# Patient Record
Sex: MALE | Race: WHITE
[De-identification: names, ages, dates, MRNs, and addresses within clinical notes are randomized per-mention and may not be internally consistent; named-entity substitution may affect disease eponyms.]

---

## 2017-09-05 ENCOUNTER — HOSPITAL ENCOUNTER (EMERGENCY)
Dept: HOSPITAL 54 - ER | Age: 42
Discharge: LEFT BEFORE DISCHARGE | End: 2017-09-05
Payer: MEDICAID

## 2017-09-05 VITALS — DIASTOLIC BLOOD PRESSURE: 73 MMHG | SYSTOLIC BLOOD PRESSURE: 122 MMHG

## 2017-09-05 VITALS — WEIGHT: 230 LBS | HEIGHT: 72 IN | BODY MASS INDEX: 31.15 KG/M2

## 2017-09-05 DIAGNOSIS — Z53.21: Primary | ICD-10-CM

## 2017-09-05 PROCEDURE — Z7610: HCPCS

## 2017-09-05 PROCEDURE — A4606 OXYGEN PROBE USED W OXIMETER: HCPCS

## 2018-04-14 ENCOUNTER — HOSPITAL ENCOUNTER (EMERGENCY)
Dept: HOSPITAL 54 - ER | Age: 43
Discharge: HOME | End: 2018-04-14
Payer: MEDICAID

## 2018-04-14 VITALS
BODY MASS INDEX: 35.78 KG/M2 | HEIGHT: 73 IN | DIASTOLIC BLOOD PRESSURE: 90 MMHG | WEIGHT: 270 LBS | SYSTOLIC BLOOD PRESSURE: 138 MMHG

## 2018-04-14 DIAGNOSIS — F17.200: ICD-10-CM
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
## 2018-04-14 DIAGNOSIS — S09.8XXA: Primary | ICD-10-CM
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
## 2018-04-14 DIAGNOSIS — F41.9: ICD-10-CM

## 2018-04-14 PROCEDURE — 99284 EMERGENCY DEPT VISIT MOD MDM: CPT

## 2018-04-14 PROCEDURE — 70450 CT HEAD/BRAIN W/O DYE: CPT

## 2018-04-14 PROCEDURE — 99406 BEHAV CHNG SMOKING 3-10 MIN: CPT

## 2018-04-14 PROCEDURE — A4606 OXYGEN PROBE USED W OXIMETER: HCPCS

## 2018-04-14 PROCEDURE — Z7610: HCPCS

## 2018-08-01 ENCOUNTER — HOSPITAL ENCOUNTER (EMERGENCY)
Dept: HOSPITAL 54 - ER | Age: 43
Discharge: HOME | End: 2018-08-01
Payer: COMMERCIAL

## 2018-08-01 VITALS — DIASTOLIC BLOOD PRESSURE: 103 MMHG | SYSTOLIC BLOOD PRESSURE: 158 MMHG

## 2018-08-01 VITALS — WEIGHT: 270 LBS | HEIGHT: 72 IN | BODY MASS INDEX: 36.57 KG/M2

## 2018-08-01 DIAGNOSIS — F41.9: Primary | ICD-10-CM

## 2018-08-01 DIAGNOSIS — K21.9: ICD-10-CM

## 2018-08-01 DIAGNOSIS — F17.200: ICD-10-CM

## 2018-08-01 PROCEDURE — 99284 EMERGENCY DEPT VISIT MOD MDM: CPT

## 2018-08-01 PROCEDURE — A4606 OXYGEN PROBE USED W OXIMETER: HCPCS

## 2018-08-01 PROCEDURE — Z7610: HCPCS

## 2018-08-01 PROCEDURE — 96372 THER/PROPH/DIAG INJ SC/IM: CPT

## 2018-08-25 ENCOUNTER — HOSPITAL ENCOUNTER (INPATIENT)
Dept: HOSPITAL 54 - ER | Age: 43
LOS: 2 days | Discharge: LEFT BEFORE DISCHARGE | DRG: 383 | End: 2018-08-27
Attending: FAMILY MEDICINE | Admitting: NURSE PRACTITIONER
Payer: MEDICAID

## 2018-08-25 VITALS — WEIGHT: 270.03 LBS | HEIGHT: 73 IN | BODY MASS INDEX: 35.79 KG/M2

## 2018-08-25 DIAGNOSIS — E87.1: ICD-10-CM

## 2018-08-25 DIAGNOSIS — E66.9: ICD-10-CM

## 2018-08-25 DIAGNOSIS — L03.116: Primary | ICD-10-CM

## 2018-08-25 DIAGNOSIS — F41.9: ICD-10-CM

## 2018-08-25 DIAGNOSIS — K21.9: ICD-10-CM

## 2018-08-25 DIAGNOSIS — F12.929: ICD-10-CM

## 2018-08-25 DIAGNOSIS — F11.21: ICD-10-CM

## 2018-08-25 DIAGNOSIS — F12.90: ICD-10-CM

## 2018-08-25 DIAGNOSIS — N17.0: ICD-10-CM

## 2018-08-25 DIAGNOSIS — F17.200: ICD-10-CM

## 2018-08-25 DIAGNOSIS — D64.9: ICD-10-CM

## 2018-08-25 LAB
APTT PPP: 31 SEC (ref 23–34)
BASOPHILS # BLD AUTO: 0.1 /CMM (ref 0–0.2)
BASOPHILS NFR BLD AUTO: 0.6 % (ref 0–2)
BUN SERPL-MCNC: 21 MG/DL (ref 7–18)
CALCIUM SERPL-MCNC: 9.4 MG/DL (ref 8.5–10.1)
CHLORIDE SERPL-SCNC: 98 MMOL/L (ref 98–107)
CO2 SERPL-SCNC: 30 MMOL/L (ref 21–32)
CREAT SERPL-MCNC: 1.3 MG/DL (ref 0.6–1.3)
EOSINOPHIL NFR BLD AUTO: 0.6 % (ref 0–6)
GLUCOSE SERPL-MCNC: 107 MG/DL (ref 74–106)
HCT VFR BLD AUTO: 40 % (ref 39–51)
HGB BLD-MCNC: 13.1 G/DL (ref 13.5–17.5)
INR PPP: 0.94 (ref 0.87–1.13)
LYMPHOCYTES NFR BLD AUTO: 1.2 /CMM (ref 0.8–4.8)
LYMPHOCYTES NFR BLD AUTO: 12.4 % (ref 20–44)
MCH RBC QN AUTO: 30 PG (ref 26–33)
MCHC RBC AUTO-ENTMCNC: 33 G/DL (ref 31–36)
MCV RBC AUTO: 91 FL (ref 80–96)
MONOCYTES NFR BLD AUTO: 1.5 /CMM (ref 0.1–1.3)
MONOCYTES NFR BLD AUTO: 15.1 % (ref 2–12)
NEUTROPHILS # BLD AUTO: 7.1 /CMM (ref 1.8–8.9)
NEUTROPHILS NFR BLD AUTO: 71.3 % (ref 43–81)
PLATELET # BLD AUTO: 274 /CMM (ref 150–450)
POTASSIUM SERPL-SCNC: 3.7 MMOL/L (ref 3.5–5.1)
RBC # BLD AUTO: 4.36 MIL/UL (ref 4.5–6)
RDW COEFFICIENT OF VARIATION: 15 (ref 11.5–15)
SODIUM SERPL-SCNC: 132 MMOL/L (ref 136–145)
WBC NRBC COR # BLD AUTO: 10 K/UL (ref 4.3–11)

## 2018-08-25 PROCEDURE — A4606 OXYGEN PROBE USED W OXIMETER: HCPCS

## 2018-08-25 PROCEDURE — Z7610: HCPCS

## 2018-08-25 NOTE — NUR
PT AMBULATORY TO ER BED 1. BBSELF C/C LEFT LOWER EXTREMITY REDNESS/SWELLING X2 
DAYS. DENIES TRAUMA. PT PLACED IN GOWN AND ON CARDIAC MONITOR. VSS/RESP EVEN 
UNLABORED/NAD NOTED/SKIN WARM AND DRY/AFEBRILE/DENIES N-V-D/AOX4. AWAITNG MD HAYES.

## 2018-08-25 NOTE — NUR
18G IV TO R AC X 1 ATTEMPT USING ASEPTIC TECH, BLOOD HANDED OVER TO THE LAB AT 
BEDSIDE. IV FLUSHES EASILY WITH NS, NO S/S INFILTRATION NOTED AT THIS TIME.

## 2018-08-26 VITALS — DIASTOLIC BLOOD PRESSURE: 75 MMHG | SYSTOLIC BLOOD PRESSURE: 131 MMHG

## 2018-08-26 VITALS — SYSTOLIC BLOOD PRESSURE: 98 MMHG | DIASTOLIC BLOOD PRESSURE: 53 MMHG

## 2018-08-26 VITALS — SYSTOLIC BLOOD PRESSURE: 111 MMHG | DIASTOLIC BLOOD PRESSURE: 67 MMHG

## 2018-08-26 VITALS — DIASTOLIC BLOOD PRESSURE: 71 MMHG | SYSTOLIC BLOOD PRESSURE: 105 MMHG

## 2018-08-26 LAB
ALBUMIN SERPL BCP-MCNC: 3.4 G/DL (ref 3.4–5)
ALP SERPL-CCNC: 55 U/L (ref 46–116)
ALT SERPL W P-5'-P-CCNC: 26 U/L (ref 12–78)
AST SERPL W P-5'-P-CCNC: 14 U/L (ref 15–37)
BASOPHILS # BLD AUTO: 0 /CMM (ref 0–0.2)
BASOPHILS NFR BLD AUTO: 0.5 % (ref 0–2)
BILIRUB SERPL-MCNC: 0.4 MG/DL (ref 0.2–1)
BUN SERPL-MCNC: 19 MG/DL (ref 7–18)
CALCIUM SERPL-MCNC: 8.7 MG/DL (ref 8.5–10.1)
CHLORIDE SERPL-SCNC: 101 MMOL/L (ref 98–107)
CO2 SERPL-SCNC: 29 MMOL/L (ref 21–32)
CREAT SERPL-MCNC: 1.1 MG/DL (ref 0.6–1.3)
EOSINOPHIL NFR BLD AUTO: 1.7 % (ref 0–6)
GLUCOSE SERPL-MCNC: 112 MG/DL (ref 74–106)
HCT VFR BLD AUTO: 39 % (ref 39–51)
HGB BLD-MCNC: 12.9 G/DL (ref 13.5–17.5)
LYMPHOCYTES NFR BLD AUTO: 1.6 /CMM (ref 0.8–4.8)
LYMPHOCYTES NFR BLD AUTO: 22.4 % (ref 20–44)
MAGNESIUM SERPL-MCNC: 2.2 MG/DL (ref 1.8–2.4)
MCH RBC QN AUTO: 31 PG (ref 26–33)
MCHC RBC AUTO-ENTMCNC: 33 G/DL (ref 31–36)
MCV RBC AUTO: 93 FL (ref 80–96)
MONOCYTES NFR BLD AUTO: 1.4 /CMM (ref 0.1–1.3)
MONOCYTES NFR BLD AUTO: 18.7 % (ref 2–12)
NEUTROPHILS # BLD AUTO: 4.1 /CMM (ref 1.8–8.9)
NEUTROPHILS NFR BLD AUTO: 56.7 % (ref 43–81)
PHOSPHATE SERPL-MCNC: 4.8 MG/DL (ref 2.5–4.9)
PLATELET # BLD AUTO: 246 /CMM (ref 150–450)
POTASSIUM SERPL-SCNC: 3.9 MMOL/L (ref 3.5–5.1)
PROT SERPL-MCNC: 7.3 G/DL (ref 6.4–8.2)
RBC # BLD AUTO: 4.24 MIL/UL (ref 4.5–6)
RDW COEFFICIENT OF VARIATION: 15.1 (ref 11.5–15)
SODIUM SERPL-SCNC: 136 MMOL/L (ref 136–145)
WBC NRBC COR # BLD AUTO: 7.3 K/UL (ref 4.3–11)

## 2018-08-26 RX ADMIN — ENOXAPARIN SODIUM SCH MG: 40 INJECTION SUBCUTANEOUS at 09:46

## 2018-08-26 RX ADMIN — DEXTROSE MONOHYDRATE SCH MLS/HR: 50 INJECTION, SOLUTION INTRAVENOUS at 09:44

## 2018-08-26 RX ADMIN — HYDROCODONE BITARTRATE AND ACETAMINOPHEN PRN EA: 10; 325 TABLET ORAL at 21:43

## 2018-08-26 RX ADMIN — DEXTROSE MONOHYDRATE SCH MLS/HR: 50 INJECTION, SOLUTION INTRAVENOUS at 16:27

## 2018-08-26 RX ADMIN — HYDROCODONE BITARTRATE AND ACETAMINOPHEN PRN EA: 10; 325 TABLET ORAL at 02:46

## 2018-08-26 NOTE — NUR
MS RN NOTES



PATIENT RECEIVED RESTING INSIDE ROOM. AWAKE, ALERT AND ORIENTED X 4, VERBALLY RESPONSIVE AND 
RESPONDS TO VERBAL AND TACTILE STIMULI. BREATHING EVEN AND UNLABORED. NO SOB OR ACUTE 
DISTRESS NOTED AT THIS TIME. PATIENT CALM AND RELAXED. NO CHANGES IN LOC NOTED. IV INTACT 
AND PATENT.  CONTINUE TO OFFLOAD LLE WITH PILLOWS WHILE ON BED. WILL CONTINUE TO MONITOR. 
BED LOCKED AND IN LOW POSITION. BILATERAL UPPER SIDE RAILS UP AND LOCKED. CALL LIGHT WITHIN 
EASY REACH.

## 2018-08-26 NOTE — NUR
PRN NORCO:

PT C/O LEFT LEG PAIN REQUESTING FOR PAIN MEDICATION, PRN NORCO 10/325 MG TAB P[O 
ADMINISTERED AT THIS TIME, WILL CONTINUE TO MONITOR AND REASSESS

## 2018-08-26 NOTE — NUR
RN NOTES:

CONTACTED ER, TALKED TO PETR RN, ASKED ABOUT MRSA, PER PETR IT WAS DONE BY THE TECH AND 
THEY WILL PLACE THE ORDER

## 2018-08-26 NOTE — NUR
Called diamond  at [phone]. per  she is still 
awaiting call back from her MD, and will contue to monitor

## 2018-08-26 NOTE — NUR
MS RN NOTES



PATIENT RESTING INSIDE ROOM. AWAKE, ALERT AND ORIENTED X 4, VERBALLY RESPONSIVE AND RESPONDS 
TO VERBAL AND TACTILE STIMULI. BREATHING EVEN AND UNLABORED. NO SOB OR ACUTE DISTRESS NOTED. 
PATIENT AFEBRILE, SKIN DRY AND WARM TO TOUCH. DENIES ANY PAIN AT THIS TIME. IV REMAINS 
INTACT, NO SWELLING OR BLEEDING ON SITE. MAINTAINED OFFLOADING OF LLE WITH PILLOWS WHILE ON 
BED. WILL ENDORSE TO INCOMING SHIFT FOR JAVIER. BED LOCKED AND IN LOW POSITION. BILATERAL UPPER 
SIDE RAILS UP AND LOCKED. CALL LIGHT WITHIN EASY REACH

## 2018-08-26 NOTE — NUR
MS RN INITIAL NOTES

Report received at bedside. Patient received in bed, resting comfortably, easily aroused. 
Alert and oriented x4, verbally responsive. No complaints of pain. IV on right ac noted. On 
antibiotic therapy for cellulitis on left lower extremity. Safety measures in place. No 
SOB/labored breathing noted. Not in any type of distress. Will continue to monitor and 
assess patient.

## 2018-08-26 NOTE — NUR
RN CLOSING NOTES:

PT IN BED, AWAKE, ON RA, RESPIRATION EVEN AND UNLABORED, IV ACCESS REMAINS PATENT AND 
FLUSHING WELL, FREE FROM ANY S/S OF REDNESS OR INFILTRATION. LLE OFFLOADED. VS REMAINS 
STABLE, NEEDS ATTENDED. SAFETY PRECAUTIONS FOR FALL REMAINS ENGAGED, CALL LIGHT IN REACH, 
WILL ENDORSE TO DAY RN FOR CONTINUITY OF CARE.

## 2018-08-26 NOTE — NUR
----- Message from Malena Christina sent at 5/23/2017  8:59 AM CDT -----  Contact: Skip/[phone]  Type:Home Health(orders,updates,clarifications,etc)    Home Health Agency/Nurse: [facility]    Phone number:[phone]    Reason for call: Report    Comments: Joseluis is calling to report that the confusion on the patient has increase, patient was up all night (she was saying that people where in the [place]), she did not urinate through all the night with a possible UTI. Please call and advise.           Thank you!!!   PT WILL NOW BE ADMITTED HERE, PT TBA TO M/S 322. BEENA HUNTER GIVEN REPORT EARILER.

## 2018-08-26 NOTE — NUR
[phone] fax

[phone] diamond shelley

-------------------------------------------------------------------------------

Addendum: 08/26/18 at 0055 by ALAN

-------------------------------------------------------------------------------

faxed face sheet and clinicals to

## 2018-08-26 NOTE — NUR
ADMISSION NOTES:

RECEIVED REPORT FROM PETR ACEVEDO RN, PT BROUGHT TO THE UNIT VIA WHEELCHAIR, WIFE AT BED SIDE. 
PT IS A/O X3 ON RA RESPIRATION EVEN AND UNLABORED, LEFT LOWER EXTREMITY SWELLING, WITH 
REDNESS AND BRUISES NOTED EXTENDING TO CALF AREA, PT ABLE TO WIGGLE AND MOVE TOES AND FOOT, 
POSITIVE WITH SENSATION ABLE TO DISTINGUISH BETWEEN SHARP AND BLUNT, WITH GOOD CAPILLARY 
REFILL NOTED, DENIES ANY TINGLING OR NUMBNESS SENSATION BUT CLAIMED ITS PAINFUL/BURNING WHEN 
TOUCH. PEDAL PULSE PALPABLE. OFFLOADED ON PILLOWS. PT BEING ADMITTED FOR CELLULITIS LLE. 
ORIENTED PT TO UNIT POLICY AND HOURLY ROUNDING, USE OF CALL LIGHT SYSTEM. VS TAKEN AND 
RECORDED. ANSWER QUERIES OF PT AND PT'S WIFE. DISCUSSED PLAN OF CARE TO PT, AND POSSIBLE US 
OF LLE TO R/O DVT. INVENTORY OF BELONGINGS COMPLETED BY YESSICA DELGADO. SKIN ASSESSMENT PERFORMED 
NO OTHER SKIN ISSUES NOTED ASIDE FROM SWELLING ON LEFT LEG. SAFETY PRECAUTIONS FOR FALL 
INITIATED, CALL LIGHT IN REACH, WILL CONTINUE MONITORING PT.

## 2018-08-27 VITALS — DIASTOLIC BLOOD PRESSURE: 76 MMHG | SYSTOLIC BLOOD PRESSURE: 114 MMHG

## 2018-08-27 LAB
BASOPHILS # BLD AUTO: 0 /CMM (ref 0–0.2)
BASOPHILS NFR BLD AUTO: 0.6 % (ref 0–2)
BUN SERPL-MCNC: 13 MG/DL (ref 7–18)
CALCIUM SERPL-MCNC: 8.5 MG/DL (ref 8.5–10.1)
CHLORIDE SERPL-SCNC: 102 MMOL/L (ref 98–107)
CO2 SERPL-SCNC: 29 MMOL/L (ref 21–32)
CREAT SERPL-MCNC: 0.9 MG/DL (ref 0.6–1.3)
EOSINOPHIL NFR BLD AUTO: 2.6 % (ref 0–6)
EOSINOPHIL NFR BLD MANUAL: 3 % (ref 0–4)
GLUCOSE SERPL-MCNC: 90 MG/DL (ref 74–106)
HCT VFR BLD AUTO: 42 % (ref 39–51)
HGB BLD-MCNC: 14 G/DL (ref 13.5–17.5)
LYMPHOCYTES NFR BLD AUTO: 1.2 /CMM (ref 0.8–4.8)
LYMPHOCYTES NFR BLD AUTO: 22.2 % (ref 20–44)
LYMPHOCYTES NFR BLD MANUAL: 27 % (ref 16–48)
MAGNESIUM SERPL-MCNC: 2.1 MG/DL (ref 1.8–2.4)
MCH RBC QN AUTO: 31 PG (ref 26–33)
MCHC RBC AUTO-ENTMCNC: 33 G/DL (ref 31–36)
MCV RBC AUTO: 92 FL (ref 80–96)
MONOCYTES NFR BLD AUTO: 0.9 /CMM (ref 0.1–1.3)
MONOCYTES NFR BLD AUTO: 15.5 % (ref 2–12)
MONOCYTES NFR BLD MANUAL: 8 % (ref 0–11)
NEUTROPHILS # BLD AUTO: 3.3 /CMM (ref 1.8–8.9)
NEUTROPHILS NFR BLD AUTO: 59.1 % (ref 43–81)
NEUTS SEG NFR BLD MANUAL: 62 % (ref 42–76)
PHOSPHATE SERPL-MCNC: 3.5 MG/DL (ref 2.5–4.9)
PLATELET # BLD AUTO: 259 /CMM (ref 150–450)
POTASSIUM SERPL-SCNC: 4.2 MMOL/L (ref 3.5–5.1)
RBC # BLD AUTO: 4.59 MIL/UL (ref 4.5–6)
RDW COEFFICIENT OF VARIATION: 15.2 (ref 11.5–15)
SODIUM SERPL-SCNC: 137 MMOL/L (ref 136–145)
WBC NRBC COR # BLD AUTO: 5.5 K/UL (ref 4.3–11)

## 2018-08-27 RX ADMIN — HYDROCODONE BITARTRATE AND ACETAMINOPHEN PRN EA: 10; 325 TABLET ORAL at 12:42

## 2018-08-27 RX ADMIN — ENOXAPARIN SODIUM SCH MG: 40 INJECTION SUBCUTANEOUS at 08:23

## 2018-08-27 RX ADMIN — DEXTROSE MONOHYDRATE SCH MLS/HR: 50 INJECTION, SOLUTION INTRAVENOUS at 08:22

## 2018-08-27 RX ADMIN — HYDROCODONE BITARTRATE AND ACETAMINOPHEN PRN EA: 10; 325 TABLET ORAL at 07:35

## 2018-08-27 RX ADMIN — DEXTROSE MONOHYDRATE SCH MLS/HR: 50 INJECTION, SOLUTION INTRAVENOUS at 00:48

## 2018-08-27 NOTE — NUR
RN NOTES

PT IS LAYING DOWN IN BED, AWAKE AND ALERT, RESTING COMFORTABLY. PT ON RA, RESPIRATIONS ARE 
EVEN AND UNLABORED. IV ON RAC INTACT AND SL. NO SIGNS OF DISTRESS NOTED. SAFETY MEASURES ARE 
IN PLACE, CALL LIGHT IS IN REACH. WILL CONTINUE TO MONITOR.

## 2018-08-27 NOTE — NUR
RN NOTES

PT LEFT AGAINST MEDICAL ADVICE. PT WAS EDUCATED ON THE IMPORTANCE OF STAYING IN THE HOSPITAL 
FOR FURTHER TREATMENT AND THAT HIS HOSPITALIST WOULD BE IN SHORTLY DO DISCUSS HIS PLAN OF 
CARE. PT STATED THAT HE DOES NOT WANT TO WAIT ANY LONGER AND WANTS TO GO HOME NOW. IV AND ID 
BAND WERE REMOVED AND AMA FORM WAS SIGNED BY PT. PER PT, HE HAS ALL OF HIS BELONGINGS WITH 
HIM TO GO HOME WITH AND HIS FAMILY MEMBER WILL TAKE HIM HOME BY PRIVATE CAR. PT REFUSED TO 
TAKE ANY EDUCATION MATERIALS OR HOSPITAL WORK UP FROM ADMISSION. CHARGE RN AND SUPERVISOR 
WERE NOTIFIED.

## 2018-10-22 ENCOUNTER — HOSPITAL ENCOUNTER (EMERGENCY)
Dept: HOSPITAL 54 - ER | Age: 43
Discharge: HOME | End: 2018-10-22
Payer: COMMERCIAL

## 2018-10-22 VITALS — WEIGHT: 220 LBS | HEIGHT: 72 IN | BODY MASS INDEX: 29.8 KG/M2

## 2018-10-22 VITALS — SYSTOLIC BLOOD PRESSURE: 157 MMHG | DIASTOLIC BLOOD PRESSURE: 104 MMHG

## 2018-10-22 DIAGNOSIS — K21.9: ICD-10-CM

## 2018-10-22 DIAGNOSIS — S83.8X1A: Primary | ICD-10-CM
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
## 2018-10-22 DIAGNOSIS — F41.9: ICD-10-CM

## 2018-10-22 DIAGNOSIS — F17.200: ICD-10-CM
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
## 2018-10-22 PROCEDURE — Z7610: HCPCS

## 2018-10-22 PROCEDURE — A4606 OXYGEN PROBE USED W OXIMETER: HCPCS

## 2020-01-03 ENCOUNTER — HOSPITAL ENCOUNTER (EMERGENCY)
Dept: HOSPITAL 54 - ER | Age: 45
Discharge: HOME | End: 2020-01-03
Payer: COMMERCIAL

## 2020-01-03 VITALS — WEIGHT: 270 LBS | HEIGHT: 72 IN | BODY MASS INDEX: 36.57 KG/M2

## 2020-01-03 VITALS — SYSTOLIC BLOOD PRESSURE: 128 MMHG | DIASTOLIC BLOOD PRESSURE: 81 MMHG

## 2020-01-03 DIAGNOSIS — F41.9: ICD-10-CM

## 2020-01-03 DIAGNOSIS — J06.9: Primary | ICD-10-CM

## 2020-01-03 DIAGNOSIS — Z98.890: ICD-10-CM

## 2020-01-03 DIAGNOSIS — K21.9: ICD-10-CM

## 2020-01-03 DIAGNOSIS — F17.200: ICD-10-CM

## 2020-01-03 NOTE — NUR
PT BIBS. PRODUCTIVE COUGH W/ CHEST CONGESTION X 2 DAYS. A FEBRILE. PT AAOX4, 
VSS, BREATHING EVEND AND UNLABORED ON ROOM AIR W/ NAD NOTED. PT CONNECTED TO 
THE MONITOR AND POX

## 2020-01-17 ENCOUNTER — HOSPITAL ENCOUNTER (EMERGENCY)
Dept: HOSPITAL 54 - ER | Age: 45
Discharge: HOME | End: 2020-01-17
Payer: MEDICAID

## 2020-01-17 VITALS — BODY MASS INDEX: 36.57 KG/M2 | HEIGHT: 72 IN | WEIGHT: 270 LBS

## 2020-01-17 VITALS — SYSTOLIC BLOOD PRESSURE: 149 MMHG | DIASTOLIC BLOOD PRESSURE: 88 MMHG

## 2020-01-17 DIAGNOSIS — F41.9: ICD-10-CM

## 2020-01-17 DIAGNOSIS — R58: Primary | ICD-10-CM

## 2020-01-17 DIAGNOSIS — Z48.00: ICD-10-CM

## 2020-01-17 DIAGNOSIS — K21.9: ICD-10-CM

## 2020-01-17 DIAGNOSIS — F17.200: ICD-10-CM

## 2020-01-17 DIAGNOSIS — Z98.890: ICD-10-CM

## 2020-10-22 ENCOUNTER — HOSPITAL ENCOUNTER (EMERGENCY)
Dept: HOSPITAL 54 - ER | Age: 45
Discharge: HOME | End: 2020-10-22
Payer: MEDICAID

## 2020-10-22 VITALS — DIASTOLIC BLOOD PRESSURE: 85 MMHG | SYSTOLIC BLOOD PRESSURE: 141 MMHG

## 2020-10-22 VITALS — HEIGHT: 72 IN | WEIGHT: 280 LBS | BODY MASS INDEX: 37.93 KG/M2

## 2020-10-22 DIAGNOSIS — R50.9: ICD-10-CM

## 2020-10-22 DIAGNOSIS — F17.200: ICD-10-CM

## 2020-10-22 DIAGNOSIS — R59.0: ICD-10-CM

## 2020-10-22 DIAGNOSIS — Z20.828: ICD-10-CM

## 2020-10-22 DIAGNOSIS — B34.9: Primary | ICD-10-CM

## 2020-10-22 PROCEDURE — 87880 STREP A ASSAY W/OPTIC: CPT

## 2020-10-22 PROCEDURE — C9803 HOPD COVID-19 SPEC COLLECT: HCPCS

## 2020-10-22 PROCEDURE — 96360 HYDRATION IV INFUSION INIT: CPT

## 2020-10-22 PROCEDURE — 71045 X-RAY EXAM CHEST 1 VIEW: CPT

## 2020-10-22 PROCEDURE — 99284 EMERGENCY DEPT VISIT MOD MDM: CPT

## 2020-10-22 PROCEDURE — 87070 CULTURE OTHR SPECIMN AEROBIC: CPT

## 2020-10-22 PROCEDURE — U0003 INFECTIOUS AGENT DETECTION BY NUCLEIC ACID (DNA OR RNA); SEVERE ACUTE RESPIRATORY SYNDROME CORONAVIRUS 2 (SARS-COV-2) (CORONAVIRUS DISEASE [COVID-19]), AMPLIFIED PROBE TECHNIQUE, MAKING USE OF HIGH THROUGHPUT TECHNOLOGIES AS DESCRIBED BY CMS-2020-01-R: HCPCS

## 2020-10-22 RX ADMIN — SODIUM CHLORIDE ONE ML: 9 INJECTION, SOLUTION INTRAVENOUS at 19:15

## 2020-10-22 RX ADMIN — ACETAMINOPHEN ONE MG: 325 TABLET ORAL at 19:16

## 2020-10-22 NOTE — NUR
BIBS FROM HOME TO ER BED 6. AAOX4. NOT IN RESP DISTRESS, BREATHING EVEN AND 
UNLABORED. AMBULATORY. CAME IN FOR HEADACHE, SORETHROAT AND FEVER FOR THE PAST 
2 DAYS. UPON ASSESSMENT, PT NOTED WITH TEMP OF 98.9, REPORTS THAT HE TOOK 
IBUPROPHEN 1HR PTA. NOTED THROAT REDNESS. ANASTACIA BUENO WAS AT THE BEDSIDE FOR 
EVAL. ORDERS RECEIVED NOTED AND CARRIED OUT.

## 2020-11-10 ENCOUNTER — HOSPITAL ENCOUNTER (EMERGENCY)
Dept: HOSPITAL 54 - ER | Age: 45
Discharge: HOME | End: 2020-11-10
Payer: MEDICAID

## 2020-11-10 VITALS — SYSTOLIC BLOOD PRESSURE: 125 MMHG | DIASTOLIC BLOOD PRESSURE: 82 MMHG

## 2020-11-10 VITALS — BODY MASS INDEX: 29.8 KG/M2 | HEIGHT: 72 IN | WEIGHT: 220 LBS

## 2020-11-10 DIAGNOSIS — Z98.890: ICD-10-CM

## 2020-11-10 DIAGNOSIS — K21.9: ICD-10-CM

## 2020-11-10 DIAGNOSIS — F41.9: ICD-10-CM

## 2020-11-10 DIAGNOSIS — L03.116: Primary | ICD-10-CM

## 2020-11-10 DIAGNOSIS — L03.311: ICD-10-CM

## 2020-11-10 NOTE — NUR
PT BIB SELF C/O ABD AREA REDNESS WITH SCABBING PT STATED THAT HE BURNED IT 
SOMEWHERE. ALSO REPORTS L KNEE REDNESS/ TENDERNESS WITH A SCAB PT STATES THAT 
HE KNEELED ON A NAIL. VS CHECKED. AWAITING MD HAYES.

## 2021-01-25 ENCOUNTER — HOSPITAL ENCOUNTER (EMERGENCY)
Dept: HOSPITAL 54 - ER | Age: 46
Discharge: HOME | End: 2021-01-25
Payer: MEDICAID

## 2021-01-25 VITALS — BODY MASS INDEX: 36.57 KG/M2 | WEIGHT: 270 LBS | HEIGHT: 72 IN

## 2021-01-25 VITALS — SYSTOLIC BLOOD PRESSURE: 132 MMHG | DIASTOLIC BLOOD PRESSURE: 74 MMHG

## 2021-01-25 DIAGNOSIS — L03.116: Primary | ICD-10-CM

## 2021-01-25 DIAGNOSIS — F17.200: ICD-10-CM

## 2021-05-25 ENCOUNTER — HOSPITAL ENCOUNTER (EMERGENCY)
Dept: HOSPITAL 54 - ER | Age: 46
LOS: 1 days | Discharge: HOME | End: 2021-05-26
Payer: MEDICAID

## 2021-05-25 VITALS — HEIGHT: 72 IN | WEIGHT: 270 LBS | BODY MASS INDEX: 36.57 KG/M2

## 2021-05-25 DIAGNOSIS — R11.0: Primary | ICD-10-CM

## 2021-05-25 DIAGNOSIS — Z98.890: ICD-10-CM

## 2021-05-25 DIAGNOSIS — R94.31: ICD-10-CM

## 2021-05-25 DIAGNOSIS — T50.995A: ICD-10-CM

## 2021-05-25 DIAGNOSIS — F17.200: ICD-10-CM
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
## 2021-05-25 DIAGNOSIS — R07.89: ICD-10-CM

## 2021-05-25 LAB
ALBUMIN SERPL BCP-MCNC: 4.5 G/DL (ref 3.4–5)
ALP SERPL-CCNC: 56 U/L (ref 46–116)
ALT SERPL W P-5'-P-CCNC: 34 U/L (ref 12–78)
AST SERPL W P-5'-P-CCNC: 14 U/L (ref 15–37)
BASOPHILS # BLD AUTO: 0.1 /CMM (ref 0–0.2)
BASOPHILS NFR BLD AUTO: 1 % (ref 0–2)
BILIRUB DIRECT SERPL-MCNC: 0.1 MG/DL (ref 0–0.2)
BILIRUB SERPL-MCNC: 0.3 MG/DL (ref 0.2–1)
BUN SERPL-MCNC: 17 MG/DL (ref 7–18)
CALCIUM SERPL-MCNC: 10 MG/DL (ref 8.5–10.1)
CHLORIDE SERPL-SCNC: 105 MMOL/L (ref 98–107)
CO2 SERPL-SCNC: 29 MMOL/L (ref 21–32)
CREAT SERPL-MCNC: 1.2 MG/DL (ref 0.6–1.3)
EOSINOPHIL NFR BLD AUTO: 1.5 % (ref 0–6)
GLUCOSE SERPL-MCNC: 99 MG/DL (ref 74–106)
HCT VFR BLD AUTO: 48 % (ref 39–51)
HGB BLD-MCNC: 16.1 G/DL (ref 13.5–17.5)
LYMPHOCYTES NFR BLD AUTO: 1.8 /CMM (ref 0.8–4.8)
LYMPHOCYTES NFR BLD AUTO: 23.5 % (ref 20–44)
MCHC RBC AUTO-ENTMCNC: 33 G/DL (ref 31–36)
MCV RBC AUTO: 91 FL (ref 80–96)
MONOCYTES NFR BLD AUTO: 0.8 /CMM (ref 0.1–1.3)
MONOCYTES NFR BLD AUTO: 10.2 % (ref 2–12)
NEUTROPHILS # BLD AUTO: 4.8 /CMM (ref 1.8–8.9)
NEUTROPHILS NFR BLD AUTO: 63.8 % (ref 43–81)
NT-PROBNP SERPL-MCNC: 13 PG/ML (ref 0–125)
PLATELET # BLD AUTO: 288 /CMM (ref 150–450)
POTASSIUM SERPL-SCNC: 4.3 MMOL/L (ref 3.5–5.1)
PROT SERPL-MCNC: 8.6 G/DL (ref 6.4–8.2)
RBC # BLD AUTO: 5.34 MIL/UL (ref 4.5–6)
SODIUM SERPL-SCNC: 143 MMOL/L (ref 136–145)
WBC NRBC COR # BLD AUTO: 7.5 K/UL (ref 4.3–11)

## 2021-05-25 PROCEDURE — 93005 ELECTROCARDIOGRAM TRACING: CPT

## 2021-05-25 PROCEDURE — 80076 HEPATIC FUNCTION PANEL: CPT

## 2021-05-25 PROCEDURE — 82962 GLUCOSE BLOOD TEST: CPT

## 2021-05-25 PROCEDURE — 84484 ASSAY OF TROPONIN QUANT: CPT

## 2021-05-25 PROCEDURE — 96361 HYDRATE IV INFUSION ADD-ON: CPT

## 2021-05-25 PROCEDURE — 83880 ASSAY OF NATRIURETIC PEPTIDE: CPT

## 2021-05-25 PROCEDURE — 71045 X-RAY EXAM CHEST 1 VIEW: CPT

## 2021-05-25 PROCEDURE — 96374 THER/PROPH/DIAG INJ IV PUSH: CPT

## 2021-05-25 PROCEDURE — 80048 BASIC METABOLIC PNL TOTAL CA: CPT

## 2021-05-25 PROCEDURE — 85025 COMPLETE CBC W/AUTO DIFF WBC: CPT

## 2021-05-25 PROCEDURE — 36415 COLL VENOUS BLD VENIPUNCTURE: CPT

## 2021-05-25 PROCEDURE — 85730 THROMBOPLASTIN TIME PARTIAL: CPT

## 2021-05-25 PROCEDURE — 99285 EMERGENCY DEPT VISIT HI MDM: CPT

## 2021-05-25 NOTE — NUR
pt bibself c/o palpitation, flashes in eyes, and ringing in ears after taking 2 
pills of gensing and 1 pill of super horney goat weed. Pt aaox4 breathing 
evenly and unlabored. Pt skin is cool and clammy, but intact. Rt ac 20g 
initated. Pt attached to monitor and pox. Pt given blanket and call light 
within reach. will continue to monitor.

## 2021-05-26 VITALS — SYSTOLIC BLOOD PRESSURE: 140 MMHG | DIASTOLIC BLOOD PRESSURE: 76 MMHG

## 2024-11-24 ENCOUNTER — HOSPITAL ENCOUNTER (EMERGENCY)
Dept: HOSPITAL 54 - ER | Age: 49
Discharge: HOME | End: 2024-11-24
Payer: MEDICAID

## 2024-11-24 VITALS — BODY MASS INDEX: 37.93 KG/M2 | HEIGHT: 72 IN | WEIGHT: 280 LBS

## 2024-11-24 VITALS — OXYGEN SATURATION: 99 % | TEMPERATURE: 98.7 F | SYSTOLIC BLOOD PRESSURE: 128 MMHG | DIASTOLIC BLOOD PRESSURE: 71 MMHG

## 2024-11-24 DIAGNOSIS — F17.200: ICD-10-CM

## 2024-11-24 DIAGNOSIS — R09.89: ICD-10-CM

## 2024-11-24 DIAGNOSIS — R42: Primary | ICD-10-CM

## 2024-11-24 DIAGNOSIS — R07.9: ICD-10-CM

## 2024-11-24 DIAGNOSIS — R14.0: ICD-10-CM

## 2024-11-24 DIAGNOSIS — R53.1: ICD-10-CM

## 2024-11-24 DIAGNOSIS — R00.0: ICD-10-CM

## 2024-11-24 DIAGNOSIS — B34.9: ICD-10-CM

## 2024-11-24 LAB
ALBUMIN SERPL BCP-MCNC: 4 G/DL (ref 3.4–5)
ALP SERPL-CCNC: 59 U/L (ref 46–116)
ALT SERPL W P-5'-P-CCNC: 22 U/L (ref 12–78)
APPEARANCE UR: CLEAR
AST SERPL W P-5'-P-CCNC: 10 U/L (ref 15–37)
BACTERIA UR CULT: NO
BASOPHILS # BLD AUTO: 0.1 K/UL (ref 0–0.2)
BASOPHILS NFR BLD AUTO: 0.5 % (ref 0–2)
BILIRUB DIRECT SERPL-MCNC: 0.1 MG/DL (ref 0–0.2)
BILIRUB SERPL-MCNC: 0.2 MG/DL (ref 0.2–1)
BILIRUB UR QL STRIP: NEGATIVE
BUN SERPL-MCNC: 11 MG/DL (ref 7–18)
CALCIUM SERPL-MCNC: 9.1 MG/DL (ref 8.5–10.1)
CHLORIDE SERPL-SCNC: 105 MMOL/L (ref 98–107)
CO2 SERPL-SCNC: 27 MMOL/L (ref 21–32)
COLOR UR: YELLOW
CREAT SERPL-MCNC: 1.1 MG/DL (ref 0.6–1.3)
DEPRECATED SQUAMOUS URNS QL MICRO: (no result) /HPF
EOSINOPHIL # BLD AUTO: 0 K/UL (ref 0–0.7)
EOSINOPHIL NFR BLD AUTO: 0.3 % (ref 0–6)
ERYTHROCYTE [DISTWIDTH] IN BLOOD BY AUTOMATED COUNT: 18.3 % (ref 11.5–15)
GLUCOSE SERPL-MCNC: 119 MG/DL (ref 74–106)
GLUCOSE UR STRIP-MCNC: NEGATIVE MG/DL
HCT VFR BLD AUTO: 41 % (ref 39–51)
HGB BLD-MCNC: 13.7 G/DL (ref 13.5–17.5)
HGB UR QL STRIP: (no result) ERY/UL
KETONES UR STRIP-MCNC: (no result) MG/DL
LEUKOCYTE ESTERASE UR QL STRIP: NEGATIVE
LIPASE SERPL-CCNC: 27 U/L (ref 16–77)
LYMPHOCYTES NFR BLD AUTO: 0.8 K/UL (ref 0.8–4.8)
LYMPHOCYTES NFR BLD AUTO: 7 % (ref 20–44)
MCH RBC QN AUTO: 29 PG (ref 26–33)
MCHC RBC AUTO-ENTMCNC: 33 G/DL (ref 31–36)
MCV RBC AUTO: 88 FL (ref 80–96)
MONOCYTES NFR BLD AUTO: 0.9 K/UL (ref 0.1–1.3)
MONOCYTES NFR BLD AUTO: 7.7 % (ref 2–12)
NEUTROPHILS # BLD AUTO: 9.7 K/UL (ref 1.8–8.9)
NEUTROPHILS NFR BLD AUTO: 84.5 % (ref 43–81)
NITRITE UR QL STRIP: NEGATIVE
PH UR STRIP: 5.5 [PH] (ref 5–8)
PLATELET # BLD AUTO: 278 K/UL (ref 150–450)
POTASSIUM SERPL-SCNC: 4.3 MMOL/L (ref 3.5–5.1)
PROT SERPL-MCNC: 7.7 G/DL (ref 6.4–8.2)
PROT UR QL STRIP: (no result) MG/DL
RBC # BLD AUTO: 4.69 MIL/UL (ref 4.5–6)
SODIUM SERPL-SCNC: 140 MMOL/L (ref 136–145)
SP GR UR STRIP: 1.03 (ref 1–1.03)
UROBILINOGEN UR STRIP-MCNC: 0.2 EU/DL
WBC #/AREA URNS HPF: (no result) /HPF (ref 0–3)
WBC NRBC COR # BLD AUTO: 11.5 K/UL (ref 4.3–11)

## 2024-11-24 PROCEDURE — 36415 COLL VENOUS BLD VENIPUNCTURE: CPT

## 2024-11-24 PROCEDURE — 81001 URINALYSIS AUTO W/SCOPE: CPT

## 2024-11-24 PROCEDURE — 93005 ELECTROCARDIOGRAM TRACING: CPT

## 2024-11-24 PROCEDURE — 71045 X-RAY EXAM CHEST 1 VIEW: CPT

## 2024-11-24 PROCEDURE — 83690 ASSAY OF LIPASE: CPT

## 2024-11-24 PROCEDURE — 82962 GLUCOSE BLOOD TEST: CPT

## 2024-11-24 PROCEDURE — 84484 ASSAY OF TROPONIN QUANT: CPT

## 2024-11-24 PROCEDURE — 80048 BASIC METABOLIC PNL TOTAL CA: CPT

## 2024-11-24 PROCEDURE — 85025 COMPLETE CBC W/AUTO DIFF WBC: CPT

## 2024-11-24 PROCEDURE — 99285 EMERGENCY DEPT VISIT HI MDM: CPT

## 2024-11-24 PROCEDURE — 83880 ASSAY OF NATRIURETIC PEPTIDE: CPT

## 2024-11-24 PROCEDURE — 80076 HEPATIC FUNCTION PANEL: CPT

## 2024-11-24 PROCEDURE — 96374 THER/PROPH/DIAG INJ IV PUSH: CPT

## 2024-11-24 RX ADMIN — SODIUM CHLORIDE ONE MG: 9 INJECTION, SOLUTION INTRAVENOUS at 14:39

## 2024-11-24 RX ADMIN — MECLIZINE ONE MG: 12.5 TABLET ORAL at 14:40
